# Patient Record
Sex: FEMALE | Race: WHITE | URBAN - METROPOLITAN AREA
[De-identification: names, ages, dates, MRNs, and addresses within clinical notes are randomized per-mention and may not be internally consistent; named-entity substitution may affect disease eponyms.]

---
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
## 2025-01-17 ENCOUNTER — HOSPITAL ENCOUNTER (EMERGENCY)
Facility: CLINIC | Age: 25
Discharge: HOME | End: 2025-01-17
Attending: EMERGENCY MEDICINE | Admitting: EMERGENCY MEDICINE
Payer: COMMERCIAL

## 2025-01-17 ENCOUNTER — APPOINTMENT (OUTPATIENT)
Dept: CT IMAGING | Facility: CLINIC | Age: 25
End: 2025-01-17
Attending: EMERGENCY MEDICINE
Payer: COMMERCIAL

## 2025-01-17 VITALS
RESPIRATION RATE: 16 BRPM | BODY MASS INDEX: 22.96 KG/M2 | HEIGHT: 69 IN | TEMPERATURE: 98.9 F | OXYGEN SATURATION: 100 % | HEART RATE: 80 BPM | DIASTOLIC BLOOD PRESSURE: 76 MMHG | WEIGHT: 155 LBS | SYSTOLIC BLOOD PRESSURE: 122 MMHG

## 2025-01-17 DIAGNOSIS — R05.2 SUBACUTE COUGH: ICD-10-CM

## 2025-01-17 LAB
ANION GAP SERPL CALCULATED.3IONS-SCNC: 10 MMOL/L (ref 7–15)
BASOPHILS # BLD AUTO: 0.1 10E3/UL (ref 0–0.2)
BASOPHILS NFR BLD AUTO: 1 %
BUN SERPL-MCNC: 15.2 MG/DL (ref 6–20)
CALCIUM SERPL-MCNC: 9.1 MG/DL (ref 8.8–10.4)
CHLORIDE SERPL-SCNC: 103 MMOL/L (ref 98–107)
CREAT SERPL-MCNC: 0.88 MG/DL (ref 0.51–0.95)
EGFRCR SERPLBLD CKD-EPI 2021: >90 ML/MIN/1.73M2
EOSINOPHIL # BLD AUTO: 0.1 10E3/UL (ref 0–0.7)
EOSINOPHIL NFR BLD AUTO: 1 %
ERYTHROCYTE [DISTWIDTH] IN BLOOD BY AUTOMATED COUNT: 12.6 % (ref 10–15)
GLUCOSE SERPL-MCNC: 93 MG/DL (ref 70–99)
HCO3 SERPL-SCNC: 25 MMOL/L (ref 22–29)
HCT VFR BLD AUTO: 37.1 % (ref 35–47)
HGB BLD-MCNC: 12.3 G/DL (ref 11.7–15.7)
HOLD SPECIMEN: NORMAL
HOLD SPECIMEN: NORMAL
IMM GRANULOCYTES # BLD: 0 10E3/UL
IMM GRANULOCYTES NFR BLD: 0 %
LYMPHOCYTES # BLD AUTO: 1.7 10E3/UL (ref 0.8–5.3)
LYMPHOCYTES NFR BLD AUTO: 25 %
MCH RBC QN AUTO: 30.1 PG (ref 26.5–33)
MCHC RBC AUTO-ENTMCNC: 33.2 G/DL (ref 31.5–36.5)
MCV RBC AUTO: 91 FL (ref 78–100)
MONOCYTES # BLD AUTO: 0.5 10E3/UL (ref 0–1.3)
MONOCYTES NFR BLD AUTO: 7 %
NEUTROPHILS # BLD AUTO: 4.5 10E3/UL (ref 1.6–8.3)
NEUTROPHILS NFR BLD AUTO: 66 %
NRBC # BLD AUTO: 0 10E3/UL
NRBC BLD AUTO-RTO: 0 /100
PLATELET # BLD AUTO: 292 10E3/UL (ref 150–450)
POTASSIUM SERPL-SCNC: 3.9 MMOL/L (ref 3.4–5.3)
RBC # BLD AUTO: 4.09 10E6/UL (ref 3.8–5.2)
SODIUM SERPL-SCNC: 138 MMOL/L (ref 135–145)
TROPONIN T SERPL HS-MCNC: <6 NG/L
WBC # BLD AUTO: 6.8 10E3/UL (ref 4–11)

## 2025-01-17 PROCEDURE — 80048 BASIC METABOLIC PNL TOTAL CA: CPT | Performed by: EMERGENCY MEDICINE

## 2025-01-17 PROCEDURE — 84484 ASSAY OF TROPONIN QUANT: CPT | Performed by: EMERGENCY MEDICINE

## 2025-01-17 PROCEDURE — 93005 ELECTROCARDIOGRAM TRACING: CPT

## 2025-01-17 PROCEDURE — 85025 COMPLETE CBC W/AUTO DIFF WBC: CPT | Performed by: EMERGENCY MEDICINE

## 2025-01-17 PROCEDURE — 82374 ASSAY BLOOD CARBON DIOXIDE: CPT | Performed by: EMERGENCY MEDICINE

## 2025-01-17 PROCEDURE — 36415 COLL VENOUS BLD VENIPUNCTURE: CPT | Performed by: EMERGENCY MEDICINE

## 2025-01-17 PROCEDURE — 71275 CT ANGIOGRAPHY CHEST: CPT

## 2025-01-17 PROCEDURE — 250N000011 HC RX IP 250 OP 636: Performed by: EMERGENCY MEDICINE

## 2025-01-17 PROCEDURE — 99285 EMERGENCY DEPT VISIT HI MDM: CPT | Mod: 25

## 2025-01-17 RX ORDER — IOPAMIDOL 755 MG/ML
500 INJECTION, SOLUTION INTRAVASCULAR ONCE
Status: COMPLETED | OUTPATIENT
Start: 2025-01-17 | End: 2025-01-17

## 2025-01-17 RX ADMIN — IOPAMIDOL 60 ML: 755 INJECTION, SOLUTION INTRAVENOUS at 20:32

## 2025-01-17 ASSESSMENT — ACTIVITIES OF DAILY LIVING (ADL)
ADLS_ACUITY_SCORE: 41
ADLS_ACUITY_SCORE: 41

## 2025-01-17 ASSESSMENT — COLUMBIA-SUICIDE SEVERITY RATING SCALE - C-SSRS
6. HAVE YOU EVER DONE ANYTHING, STARTED TO DO ANYTHING, OR PREPARED TO DO ANYTHING TO END YOUR LIFE?: NO
1. IN THE PAST MONTH, HAVE YOU WISHED YOU WERE DEAD OR WISHED YOU COULD GO TO SLEEP AND NOT WAKE UP?: NO
2. HAVE YOU ACTUALLY HAD ANY THOUGHTS OF KILLING YOURSELF IN THE PAST MONTH?: NO

## 2025-01-17 NOTE — ED TRIAGE NOTES
Pt presents for evaluation of an elevated D dimer of 1.63. Pt was seen at her clinic for chest pain, cough and shortness of breath for the last 2 months. Pain is in upper chest, has been intermittent, rated 7/10. Dyspnea with exertion. Has had other labs and chest xrays, but with no dx. No recent surgery or travels.

## 2025-01-18 NOTE — ED PROVIDER NOTES
"  Emergency Department Note      History of Present Illness     Chief Complaint   Abnormal Labs, Chest Pain, Cough, and Shortness of Breath      HPI   Bhumika Dao is a 24 year old female who presents to the Emergency Department for chest pain, cough, and shortness of breath. The patient reports she has had recurring cough, chest pain, and shortness of breath for the past two months. She notes her symptoms subsided for the past few weeks, then began recurring within the last week. The chest pain caused her to get a substitute for her dance class this week. She was given antibiotics in early December 2024 to attempt to treat her symptoms. Denies history of asthma, coughing up blood, or leg edema.    Independent Historian   None    Review of External Notes   I note the patient was seen 11/27/24, 12/20/24, and 12/26/24 for a cough. She had a CXR completed 1/17/25 and I reviewed the office visit note from today.     Past Medical History     Medical History and Problem List   History reviewed. No pertinent past medical history.     Medications   The patient is not currently taking any perscribed medications.     Surgical History   Partial right nephrectomy    Physical Exam     Patient Vitals for the past 24 hrs:   BP Temp Temp src Pulse Resp SpO2 Height Weight   01/17/25 2123 122/76 -- -- 80 16 100 % -- --   01/17/25 1721 130/82 98.9  F (37.2  C) Oral 85 18 99 % 1.753 m (5' 9\") 70.3 kg (155 lb)     Physical Exam  General: Patient is well appearing. No distress.  Head: Atraumatic.  Eyes: Conjunctivae and EOM are normal. No scleral icterus.  Neck: Normal range of motion. Neck supple.   Cardiovascular: Normal rate, regular rhythm, normal heart sounds and intact distal pulses.   Pulmonary/Chest: Breath sounds normal. No respiratory distress.  Abdominal: Soft. Bowel sounds are normal. No distension. No tenderness. No rebound or guarding.   Musculoskeletal: Normal range of motion.  Skin: Warm and dry. No rash noted. Not " diaphoretic.     Diagnostics     Lab Results   Labs Ordered and Resulted from Time of ED Arrival to Time of ED Departure   BASIC METABOLIC PANEL - Normal       Result Value    Sodium 138      Potassium 3.9      Chloride 103      Carbon Dioxide (CO2) 25      Anion Gap 10      Urea Nitrogen 15.2      Creatinine 0.88      GFR Estimate >90      Calcium 9.1      Glucose 93     TROPONIN T, HIGH SENSITIVITY - Normal    Troponin T, High Sensitivity <6     CBC WITH PLATELETS AND DIFFERENTIAL    WBC Count 6.8      RBC Count 4.09      Hemoglobin 12.3      Hematocrit 37.1      MCV 91      MCH 30.1      MCHC 33.2      RDW 12.6      Platelet Count 292      % Neutrophils 66      % Lymphocytes 25      % Monocytes 7      % Eosinophils 1      % Basophils 1      % Immature Granulocytes 0      NRBCs per 100 WBC 0      Absolute Neutrophils 4.5      Absolute Lymphocytes 1.7      Absolute Monocytes 0.5      Absolute Eosinophils 0.1      Absolute Basophils 0.1      Absolute Immature Granulocytes 0.0      Absolute NRBCs 0.0         Imaging   CT Chest Pulmonary Embolism w Contrast   Final Result   IMPRESSION:   No pulmonary embolism or other acute findings in the chest.          EKG   ECG results from 01/17/25   EKG 12-lead, tracing only     Value    Systolic Blood Pressure     Diastolic Blood Pressure     Ventricular Rate 76    Atrial Rate 76    AK Interval 128    QRS Duration 86        QTc 441    P Axis 61    R AXIS 93    T Axis 45    Interpretation ECG      Sinus rhythm  Rightward axis  Borderline ECG  No previous ECGs available  Independent interpretation done by me.      Independent Interpretation   Chest CT: no large PE masses pneumonia or effusions.     ED Course      Medications Administered   Medications   sodium chloride (PF) 0.9% PF flush 100 mL (65 mLs Intravenous $Given 1/17/25 2032)   iopamidol (ISOVUE-370) solution 500 mL (60 mLs Intravenous $Given 1/17/25 2032)       Procedures   None     Discussion of Management    None    ED Course   ED Course as of 01/18/25 0247   Fri Jan 17, 2025 1950 I evaluated the patient, obtained history, and performed a physical exam as detailed above.    2118 I rechecked the patient and updated them on the plan of care.        Additional Documentation  None    Medical Decision Making / Diagnosis     LUCIO Dao is a 24 year old female who presented to the ER for evaluation of a cough.  Differential diagnosis includes, though is not limited to, acute bronchitis, pneumonia, sinusitis, pharyngitis, reactive airway disease (asthma/COPD), CHF, GERD, influenza, pertussis, aspiration, occupational exposure, medication side effect, among others.  Pulmonary exam does not reveal wheezing, prolonged expiratory phase, nor evidence of increased work of breathing suggestive of reactive airway disease.  No history of aspiration.  No other focal evidence of infection is identified on exam to suggest acute pharyngitis, peritonsillar abscess, retropharyngeal abscess, epiglottitis, sinusitis, acute otitis media, otitis externa, nor mastoiditis warranting further advanced imaging or antibiotic therapy at this time. She is not immunocompromised which would complicate their current clinical course.  Clinical impression was discussed with the patient.  Discussed that initial symptoms may improve during the first 3-5 days, although the cough may persist  Symptomatic cares including rest, fluids, antipyretics, analgesia, and a trial of cough suppressant medication was recommended.  Patient is not a smoker.  Pt was recommended to return to the ER with shortness of breath, development of chest pain, become unable to tolerate PO, develop any other new or troubling symptoms.    All questions and concerns were answered. The patient was discharged home and recommended to follow up with her primary physician and return with any new or worsening symptoms.     Disposition   The patient was discharged.    Diagnosis      ICD-10-CM    1. Subacute cough  R05.2            Discharge Medications   There are no discharge medications for this patient.    Scribe Disclosure:  I, Caity Ruiz, am serving as a scribe at 7:46 PM on 1/17/2025 to document services personally performed by Wes Mcleod MD based on my observations and the provider's statements to me.        Wes Mcleod MD  01/18/25 0248

## 2025-01-20 LAB
ATRIAL RATE - MUSE: 76 BPM
DIASTOLIC BLOOD PRESSURE - MUSE: NORMAL MMHG
INTERPRETATION ECG - MUSE: NORMAL
P AXIS - MUSE: 61 DEGREES
PR INTERVAL - MUSE: 128 MS
QRS DURATION - MUSE: 86 MS
QT - MUSE: 392 MS
QTC - MUSE: 441 MS
R AXIS - MUSE: 93 DEGREES
SYSTOLIC BLOOD PRESSURE - MUSE: NORMAL MMHG
T AXIS - MUSE: 45 DEGREES
VENTRICULAR RATE- MUSE: 76 BPM

## 2025-01-29 ENCOUNTER — HOSPITAL ENCOUNTER (EMERGENCY)
Facility: CLINIC | Age: 25
Discharge: HOME | End: 2025-01-29
Attending: EMERGENCY MEDICINE | Admitting: EMERGENCY MEDICINE
Payer: COMMERCIAL

## 2025-01-29 VITALS
OXYGEN SATURATION: 99 % | DIASTOLIC BLOOD PRESSURE: 69 MMHG | HEIGHT: 69 IN | BODY MASS INDEX: 22.69 KG/M2 | RESPIRATION RATE: 16 BRPM | HEART RATE: 99 BPM | SYSTOLIC BLOOD PRESSURE: 115 MMHG | TEMPERATURE: 99.3 F | WEIGHT: 153.22 LBS

## 2025-01-29 DIAGNOSIS — R05.9 COUGH, UNSPECIFIED TYPE: ICD-10-CM

## 2025-01-29 DIAGNOSIS — R07.9 CHEST PAIN, UNSPECIFIED TYPE: ICD-10-CM

## 2025-01-29 LAB
ANION GAP SERPL CALCULATED.3IONS-SCNC: 11 MMOL/L (ref 7–15)
ATRIAL RATE - MUSE: 95 BPM
BASOPHILS # BLD AUTO: 0.1 10E3/UL (ref 0–0.2)
BASOPHILS NFR BLD AUTO: 0 %
BUN SERPL-MCNC: 11.5 MG/DL (ref 6–20)
CALCIUM SERPL-MCNC: 8.8 MG/DL (ref 8.8–10.4)
CHLORIDE SERPL-SCNC: 101 MMOL/L (ref 98–107)
CREAT SERPL-MCNC: 0.9 MG/DL (ref 0.51–0.95)
CRP SERPL-MCNC: 3.32 MG/L
DIASTOLIC BLOOD PRESSURE - MUSE: NORMAL MMHG
EGFRCR SERPLBLD CKD-EPI 2021: 90 ML/MIN/1.73M2
EOSINOPHIL # BLD AUTO: 0.1 10E3/UL (ref 0–0.7)
EOSINOPHIL NFR BLD AUTO: 1 %
ERYTHROCYTE [DISTWIDTH] IN BLOOD BY AUTOMATED COUNT: 12.7 % (ref 10–15)
ERYTHROCYTE [SEDIMENTATION RATE] IN BLOOD BY WESTERGREN METHOD: 9 MM/HR (ref 0–20)
FLUAV RNA SPEC QL NAA+PROBE: NEGATIVE
FLUBV RNA RESP QL NAA+PROBE: NEGATIVE
GLUCOSE SERPL-MCNC: 95 MG/DL (ref 70–99)
HCO3 SERPL-SCNC: 22 MMOL/L (ref 22–29)
HCT VFR BLD AUTO: 37 % (ref 35–47)
HGB BLD-MCNC: 12.1 G/DL (ref 11.7–15.7)
HOLD SPECIMEN: NORMAL
HOLD SPECIMEN: NORMAL
IMM GRANULOCYTES # BLD: 0.1 10E3/UL
IMM GRANULOCYTES NFR BLD: 1 %
INTERPRETATION ECG - MUSE: NORMAL
LYMPHOCYTES # BLD AUTO: 1 10E3/UL (ref 0.8–5.3)
LYMPHOCYTES NFR BLD AUTO: 8 %
MCH RBC QN AUTO: 29.6 PG (ref 26.5–33)
MCHC RBC AUTO-ENTMCNC: 32.7 G/DL (ref 31.5–36.5)
MCV RBC AUTO: 91 FL (ref 78–100)
MONOCYTES # BLD AUTO: 0.9 10E3/UL (ref 0–1.3)
MONOCYTES NFR BLD AUTO: 7 %
NEUTROPHILS # BLD AUTO: 10.3 10E3/UL (ref 1.6–8.3)
NEUTROPHILS NFR BLD AUTO: 83 %
NRBC # BLD AUTO: 0 10E3/UL
NRBC BLD AUTO-RTO: 0 /100
P AXIS - MUSE: 81 DEGREES
PLATELET # BLD AUTO: 260 10E3/UL (ref 150–450)
POTASSIUM SERPL-SCNC: 3.7 MMOL/L (ref 3.4–5.3)
PR INTERVAL - MUSE: 140 MS
QRS DURATION - MUSE: 84 MS
QT - MUSE: 360 MS
QTC - MUSE: 452 MS
R AXIS - MUSE: 90 DEGREES
RBC # BLD AUTO: 4.09 10E6/UL (ref 3.8–5.2)
RSV RNA SPEC NAA+PROBE: NEGATIVE
SARS-COV-2 RNA RESP QL NAA+PROBE: NEGATIVE
SODIUM SERPL-SCNC: 134 MMOL/L (ref 135–145)
SYSTOLIC BLOOD PRESSURE - MUSE: NORMAL MMHG
T AXIS - MUSE: 53 DEGREES
TROPONIN T SERPL HS-MCNC: <6 NG/L
VENTRICULAR RATE- MUSE: 95 BPM
WBC # BLD AUTO: 12.4 10E3/UL (ref 4–11)

## 2025-01-29 PROCEDURE — 36415 COLL VENOUS BLD VENIPUNCTURE: CPT | Performed by: EMERGENCY MEDICINE

## 2025-01-29 PROCEDURE — 80048 BASIC METABOLIC PNL TOTAL CA: CPT | Performed by: EMERGENCY MEDICINE

## 2025-01-29 PROCEDURE — 93005 ELECTROCARDIOGRAM TRACING: CPT

## 2025-01-29 PROCEDURE — 86140 C-REACTIVE PROTEIN: CPT | Performed by: EMERGENCY MEDICINE

## 2025-01-29 PROCEDURE — 85025 COMPLETE CBC W/AUTO DIFF WBC: CPT | Performed by: EMERGENCY MEDICINE

## 2025-01-29 PROCEDURE — 87637 SARSCOV2&INF A&B&RSV AMP PRB: CPT | Performed by: EMERGENCY MEDICINE

## 2025-01-29 PROCEDURE — 80051 ELECTROLYTE PANEL: CPT | Performed by: EMERGENCY MEDICINE

## 2025-01-29 PROCEDURE — 84484 ASSAY OF TROPONIN QUANT: CPT | Performed by: EMERGENCY MEDICINE

## 2025-01-29 PROCEDURE — 87798 DETECT AGENT NOS DNA AMP: CPT | Performed by: EMERGENCY MEDICINE

## 2025-01-29 PROCEDURE — 99285 EMERGENCY DEPT VISIT HI MDM: CPT | Mod: 25

## 2025-01-29 PROCEDURE — 85652 RBC SED RATE AUTOMATED: CPT | Performed by: EMERGENCY MEDICINE

## 2025-01-29 RX ORDER — PANTOPRAZOLE SODIUM 40 MG/1
40 TABLET, DELAYED RELEASE ORAL DAILY
Qty: 30 TABLET | Refills: 0 | Status: SHIPPED | OUTPATIENT
Start: 2025-01-29 | End: 2025-02-28

## 2025-01-29 RX ORDER — PREDNISONE 20 MG/1
60 TABLET ORAL DAILY
Qty: 21 TABLET | Refills: 0 | Status: SHIPPED | OUTPATIENT
Start: 2025-01-29 | End: 2025-02-05

## 2025-01-29 RX ORDER — AZITHROMYCIN 250 MG/1
500 TABLET, FILM COATED ORAL ONCE
Status: DISCONTINUED | OUTPATIENT
Start: 2025-01-29 | End: 2025-01-29

## 2025-01-29 RX ORDER — CYCLOBENZAPRINE HCL 10 MG
10 TABLET ORAL 3 TIMES DAILY PRN
Qty: 12 TABLET | Refills: 0 | Status: SHIPPED | OUTPATIENT
Start: 2025-01-29

## 2025-01-29 ASSESSMENT — COLUMBIA-SUICIDE SEVERITY RATING SCALE - C-SSRS
6. HAVE YOU EVER DONE ANYTHING, STARTED TO DO ANYTHING, OR PREPARED TO DO ANYTHING TO END YOUR LIFE?: NO
2. HAVE YOU ACTUALLY HAD ANY THOUGHTS OF KILLING YOURSELF IN THE PAST MONTH?: NO
1. IN THE PAST MONTH, HAVE YOU WISHED YOU WERE DEAD OR WISHED YOU COULD GO TO SLEEP AND NOT WAKE UP?: NO

## 2025-01-29 ASSESSMENT — ACTIVITIES OF DAILY LIVING (ADL): ADLS_ACUITY_SCORE: 41

## 2025-01-29 NOTE — ED TRIAGE NOTES
Pt arrives ambulatory into triage for persistent cough, SOB, and worsening chest pain. Here last week for similar symptoms - had a CT scan which was negative.

## 2025-01-30 LAB
B PARAPERT DNA SPEC QL NAA+PROBE: NOT DETECTED
B PERT DNA SPEC QL NAA+PROBE: NOT DETECTED

## 2025-01-30 NOTE — ED PROVIDER NOTES
"  Emergency Department Note      History of Present Illness     Chief Complaint   Chest Pain and Cough      HPI     Bhumika Dao is a 24 year old female presents with chest pain and cough.  Patient states that she was seen in the ED and 12 days ago for chest pain and cough.  She had a CT scan that was negative but symptoms have persisted.  The chest pain is through the anterior chest, squeezing and sharp.  The pain is always present but aggravated by sitting up or leaning forward.  There is associated shortness of breath.  She continues to have a relatively nonproductive cough and no fever.  She has no underlying history of lung disease.  No history of DVT, PE, unilateral leg swelling, recent immobilization, hemoptysis, malignancy or estrogen use.    Independent Historian   Mother states that symptoms have actually been present since November.  She has been on 2 rounds of antibiotics without improvement of symptoms.  She had pulmonary function tests in late December and are awaiting test results.  She is scheduled see pulmonology next month.  Mother notes patient's experience with Tessalon Perles, albuterol and antibiotics with doxycycline/azithromycin did not improve symptoms.    Review of External Notes   I reviewed ED note from 1/17/2025 which patient had a CT scan PE study of her chest secondary to elevated D-dimer which was negative for acute pathology.  The remainder of her workup was unremarkable.    Past Medical History     Medical History and Problem List   No past medical history on file.    Medications   None     Surgical History   No past surgical history on file.    Physical Exam     Patient Vitals for the past 24 hrs:   BP Temp Temp src Pulse Resp SpO2 Height Weight   01/29/25 1654 -- -- -- -- 16 -- -- --   01/29/25 1652 115/69 -- -- 99 18 99 % -- --   01/29/25 1247 122/79 99.3  F (37.4  C) Temporal 115 26 99 % 1.753 m (5' 9\") 69.5 kg (153 lb 3.5 oz)     Physical Exam      HEENT:    Oropharynx is " moist  Eyes:    Conjunctiva normal  Neck:     Supple, no meningismus.     CV:     Regular rate and rhythm.      No murmurs, rubs or gallops.       No unilateral leg swelling.       2+ radial pulses bilateral.       No lower extremity edema.  PULM:    Clear to auscultation bilateral.       No respiratory distress.      Good air exchange.     No rales or wheezing.     No stridor.     Intermittent nonproductive cough present during exam  ABD:    Soft, non-tender, non-distended.       No pulsatile masses.       No rebound, guarding or rigidity.  MSK:     No gross deformity to all four extremities.   LYMPH:   No cervical lymphadenopathy.  NEURO:   Alert. Good muscle tone, no atrophy.  Skin:    Warm, dry and intact.    Psych:    Mood is good and affect is appropriate.      Diagnostics     Lab Results   Labs Ordered and Resulted from Time of ED Arrival to Time of ED Departure   BASIC METABOLIC PANEL - Abnormal       Result Value    Sodium 134 (*)     Potassium 3.7      Chloride 101      Carbon Dioxide (CO2) 22      Anion Gap 11      Urea Nitrogen 11.5      Creatinine 0.90      GFR Estimate 90      Calcium 8.8      Glucose 95     CBC WITH PLATELETS AND DIFFERENTIAL - Abnormal    WBC Count 12.4 (*)     RBC Count 4.09      Hemoglobin 12.1      Hematocrit 37.0      MCV 91      MCH 29.6      MCHC 32.7      RDW 12.7      Platelet Count 260      % Neutrophils 83      % Lymphocytes 8      % Monocytes 7      % Eosinophils 1      % Basophils 0      % Immature Granulocytes 1      NRBCs per 100 WBC 0      Absolute Neutrophils 10.3 (*)     Absolute Lymphocytes 1.0      Absolute Monocytes 0.9      Absolute Eosinophils 0.1      Absolute Basophils 0.1      Absolute Immature Granulocytes 0.1      Absolute NRBCs 0.0     INFLUENZA A/B, RSV AND SARS-COV2 PCR - Normal    Influenza A PCR Negative      Influenza B PCR Negative      RSV PCR Negative      SARS CoV2 PCR Negative     TROPONIN T, HIGH SENSITIVITY - Normal    Troponin T, High  Sensitivity <6     ERYTHROCYTE SEDIMENTATION RATE AUTO - Normal    Erythrocyte Sedimentation Rate 9     CRP INFLAMMATION - Normal    CRP Inflammation 3.32     BORDETELLA PERTUSSIS PARAPERTUSSIS, PCR       Imaging   Chest XR,  PA & LAT   Final Result   IMPRESSION: Negative chest.          EKG   ECG results from 01/29/25   EKG 12-lead, tracing only     Value    Systolic Blood Pressure     Diastolic Blood Pressure     Ventricular Rate 95    Atrial Rate 95    VA Interval 140    QRS Duration 84        QTc 452    P Axis 81    R AXIS 90    T Axis 53    Interpretation ECG      Sinus rhythm  Possible Left atrial enlargement  Rightward axis  Borderline ECG  When compared with ECG of 17-Jan-2025 17:33,  No significant change was found  Unconfirmed report - interpretation of this ECG is computer generated - see medical record for final interpretation  Confirmed by - EMERGENCY ROOM, PHYSICIAN ([id number]),  FITO MEJIA ([id number]) on 1/29/2025 1:31:17 PM         Independent Interpretation   CXR: No pneumothorax or infiltrate.    ED Course      Medications Administered   Medications - No data to display    Procedures   Procedures     Discussion of Management   None    ED Course        Additional Documentation  None    Medical Decision Making / Diagnosis     CMS Diagnoses: None    MIPS       None    MDM   Bhumika Dao is a 24 year old female presents with recurrent chest pain and cough that has been present for the last 2 months.  No evidence of acute bronchospasm.  Viral swabs negative.  Chest x-ray negative for pneumonia.  CT scan performed 12 days ago was negative for PE or alternative intrathoracic pathology.  EKG without ischemic changes.  Cardiac enzymes within normal limits.  She has no EKG manifestations of pericarditis or myocarditis.  In addition her symptoms are much worse when sitting up or leaning forward suggesting against these pathologies.  Inflammatory markers thus far are unremarkable.  Exact source of  her symptoms are uncertain.  Pertussis swab sent although has been previously treated with azithromycin.  Differential diagnosis would include esophagitis/reflux.  She is currently on famotidine and will transition her Protonix.  Differential diagnosis would also include subclinical bronchospasm, costochondritis, pleurisy.  The patient was given a trial of steroid burst.  Scheduled follow-up with pulmonology next month and return to ED for any worsening symptoms.  Close follow-up with PCP to bridge the gap to pulmonology.    Disposition   The patient was discharged.     Diagnosis     ICD-10-CM    1. Cough, unspecified type  R05.9       2. Chest pain, unspecified type  R07.9            Discharge Medications   Discharge Medication List as of 1/29/2025  4:51 PM        START taking these medications    Details   cyclobenzaprine (FLEXERIL) 10 MG tablet Take 1 tablet (10 mg) by mouth 3 times daily as needed for muscle spasms., Disp-12 tablet, R-0, E-Prescribe      pantoprazole (PROTONIX) 40 MG EC tablet Take 1 tablet (40 mg) by mouth daily for 30 doses., Disp-30 tablet, R-0, E-Prescribe      predniSONE (DELTASONE) 20 MG tablet Take 3 tablets (60 mg) by mouth daily for 7 days., Disp-21 tablet, R-0, E-Prescribe               MD Jarrett Villagomez Jeremiah R, MD  01/29/25 2335